# Patient Record
Sex: FEMALE | Race: BLACK OR AFRICAN AMERICAN | Employment: FULL TIME | URBAN - METROPOLITAN AREA
[De-identification: names, ages, dates, MRNs, and addresses within clinical notes are randomized per-mention and may not be internally consistent; named-entity substitution may affect disease eponyms.]

---

## 2017-10-26 ENCOUNTER — HOSPITAL ENCOUNTER (EMERGENCY)
Age: 27
Discharge: HOME | End: 2017-10-26
Attending: EMERGENCY MEDICINE | Admitting: EMERGENCY MEDICINE
Payer: MEDICAID

## 2017-10-26 VITALS
HEART RATE: 74 BPM | HEIGHT: 69 IN | RESPIRATION RATE: 16 BRPM | TEMPERATURE: 97.9 F | SYSTOLIC BLOOD PRESSURE: 111 MMHG | WEIGHT: 244 LBS | BODY MASS INDEX: 36.14 KG/M2 | OXYGEN SATURATION: 100 % | DIASTOLIC BLOOD PRESSURE: 69 MMHG

## 2017-10-26 DIAGNOSIS — B37.2 CANDIDAL DERMATITIS: Primary | ICD-10-CM

## 2017-10-26 PROCEDURE — 99282 EMERGENCY DEPT VISIT SF MDM: CPT

## 2017-10-26 RX ORDER — NYSTATIN 100000 [USP'U]/G
POWDER TOPICAL
Qty: 60 G | Refills: 0 | Status: SHIPPED | OUTPATIENT
Start: 2017-10-26

## 2017-10-26 RX ORDER — NYSTATIN 100000 U/G
CREAM TOPICAL
Qty: 30 G | Refills: 0 | Status: SHIPPED | OUTPATIENT
Start: 2017-10-26

## 2017-10-26 NOTE — ED PROVIDER NOTES
[address]  EMERGENCY DEPARTMENT HISTORY AND PHYSICAL EXAM       Date: 10/26/2017   Patient Name: Domo Dsouza   YOB: 1990  Medical Record Number: [id number]    History of Presenting Illness     No chief complaint on file. History Provided By:  patient    Additional History:  12:24 PM  Domo Dsouza is a 31 y.o. female who presents to the emergency department C/O pruriticand irritating lower abdomen along C section line onset 2 weeks ago. Associated symptoms include discoloration, pruritic feet, pruritic axillary, and pruritic thighs. Patient took 1% Hydrocortizone, grease, and Benadryl with no relief. Patient admits to scratching lower abdomen. Reports similar pruritic lower abdomen along C section line 4 months ago; rx Nystatin cream with relief. Pt denies abdominal pain any other Sx or complaints. Primary Care Provider: None   Specialist:    Past History     Past Medical History:   Past Medical History:   Diagnosis Date    Seasonal asthma         Past Surgical History:   Past Surgical History:   Procedure Laterality Date    HX CHOLECYSTECTOMY      HX GYN       x2    HX GYN      ectoptic preg    HX HEMORRHOIDECTOMY          Family History:   No family history on file. Social History:   Social History   Substance Use Topics    Smoking status: Not on file    Smokeless tobacco: Not on file    Alcohol use Not on file        Allergies: Allergies not on file     Review of Systems   Review of Systems   Constitutional: Negative for chills and fever. Gastrointestinal: Negative for abdominal pain. Skin: Positive for color change (discoloration) and rash. (+) pruritic axillary, thighs, lower abdomen along  line, and feet     All other systems reviewed and are negative.       Physical Exam  Vitals:    10/26/17 1216   BP: 111/69   Pulse: 74   Resp: 16   Temp: 97.9 °F (36.6 °C)   SpO2: 100%   Weight: 110.7 kg (244 lb) Height: 5' 9\" (1.753 m)       Physical Exam   Constitutional: She is oriented to person, place, and time. Vital signs are normal. She appears well-developed and well-nourished. No distress. HENT:   Head: Normocephalic and atraumatic. Eyes: Conjunctivae are normal.   Neck: Normal range of motion. Neck supple. Cardiovascular: Normal rate, regular rhythm and normal heart sounds. Pulmonary/Chest: Effort normal and breath sounds normal. No respiratory distress. Abdominal: Soft. Bowel sounds are normal. She exhibits no distension and no mass. There is no tenderness. There is no rebound and no guarding. Large abdominal panus with underlying candidal appearing rash noted. +Pruritis. Rash is white-esther and creamy (pt applied cream prior to ED) with mild erythema which appears to be 2/2 scratching/longstanding irritation. Skin is moist.    Genitourinary:         Musculoskeletal: Normal range of motion. Right shoulder: Normal.        Left shoulder: Normal.        Right ankle: Normal.        Left ankle: Normal.        Right upper arm: Normal.        Left upper arm: Normal.   +pruritic rash with dried skin and flaking noted between toes of bilateral feet (1 & 2nd toes predominant though all 5 webbings with +flaking skin)   Neurological: She is alert and oriented to person, place, and time. Skin: Skin is warm and dry. She is not diaphoretic. Psychiatric: She has a normal mood and affect. Her behavior is normal.   Nursing note and vitals reviewed. Diagnostic Study Results     Labs -    No results found for this or any previous visit (from the past 12 hour(s)). Radiologic Studies -  The following have been ordered and reviewed:  No orders to display           Medical Decision Making   I am the first provider for this patient. I reviewed the vital signs, available nursing notes, past medical history, past surgical history, family history and social history.      Vital Signs-Reviewed the patient's vital signs. No data found. Pulse Oximetry Analysis - Normal 100% on RA     Old Medical Records: Nursing notes. Procedures:   Procedures    ED Course:  12:24 PM  Initial assessment performed. The patients presenting problems have been discussed, and they are in agreement with the care plan formulated and outlined with them. I have encouraged them to ask questions as they arise throughout their visit. Medications Given in the ED:  Medications - No data to display    Discharge Note:  12:47 PM  Pt has been reexamined. Patient has no new complaints, changes, or physical findings. Care plan outlined and precautions discussed. Results were reviewed with the patient. All medications were reviewed with the patient; will d/c home with Mycostatin. All of pt's questions and concerns were addressed. Patient was instructed and agrees to follow up with PCP, as well as to return to the ED upon further deterioration. Patient is ready to go home. Diagnosis   Clinical Impression:   1. Candidal dermatitis           Follow-up Information     Follow up With Details Comments Contact Info    Kendal Stanley DO Schedule an appointment as soon as possible for a visit For primary care follow up [address] [facility]      [facility] EMERGENCY DEPT Go to As needed, as symptoms worsen [address] Kelly [facility]  [phone]          Discharge Medication List as of 10/26/2017 12:47 PM      START taking these medications    Details   nystatin (MYCOSTATIN) topical cream Apply a thin layer to affected area(s) of abdomen twice daily, continue 5 days past resolution of symptoms. , Print, Disp-30 g, R-0      nystatin (MYCOSTATIN) powder Apply to affected area(s) of feet, groin, underarms 4 times daily, continue 5 days past resolution of symptoms. , Print, Disp-60 g, R-0             _______________________________   Attestations:      This note is prepared by Kd Marino acting as a Scribe for [employer], PA-C. on 12:18 PM on 10/26/2017 . [employer], PA-C.: The scribe's documentation has been prepared under my direction and personally reviewed by me in its entirety.   _______________________________

## 2017-10-26 NOTE — DISCHARGE INSTRUCTIONS
Yeast Skin Infection: Care Instructions  Your Care Instructions    Yeast normally lives on your skin. Sometimes too much yeast can overgrow in certain areas of the skin and cause an infection. The infection causes red, scaly, moist patches on your skin that may itch. Common areas for skin yeast infections are skin folds under the breasts or belly area. The warm and moist areas in the skin folds can make it easier for yeast to overgrow. Yeast infections also can be found on other parts of the body such as the groin or armpits. You will probably get a cream or ointment that contains an antifungal medicine. Examples of these are miconazole and clotrimazole. You put it on your skin to treat the infection. Your doctor may give you a prescription for the cream or ointment. Or you may be able to buy it without a prescription at most drugstores. If the infection is severe, the doctor will prescribe antifungal pills. A yeast infection usually goes away after about a week of treatment. But it's important to use the medicine for as long as your doctor tells you to. Follow-up care is a key part of your treatment and safety. Be sure to make and go to all appointments, and call your doctor if you are having problems. It's also a good idea to know your test results and keep a list of the medicines you take. How can you care for yourself at home? · Be safe with medicines. Take your medicines exactly as prescribed. Call your doctor if you think you are having a problem with your medicine. · Keep your skin clean and dry. Your doctor may suggest using powder that contains an antifungal medicine in the skin folds. · Wear loose clothing. When should you call for help? Call your doctor now or seek immediate medical care if:  ? · You have symptoms of infection, such as:  ¨ Increased pain, swelling, warmth, or redness. ¨ Red streaks leading from the area. ¨ Pus draining from the area. ¨ A fever. ? Watch closely for changes in your health, and be sure to contact your doctor if:  ? · You do not get better as expected. Where can you learn more? Go to http://mana-anish.info/. Enter U111 in the search box to learn more about \"Yeast Skin Infection: Care Instructions. \"  Current as of: October 13, 2016  Content Version: 11.4  © 4728-9138 [employer]. Care instructions adapted under license by [employer] (which disclaims liability or warranty for this information). If you have questions about a medical condition or this instruction, always ask your healthcare professional. [address] any warranty or liability for your use of this information.

## 2017-10-26 NOTE — ED NOTES
Pt w/ reports of itching x 2 weeks in fold of Left armpit region - fold of lower abd region and both feet - + skin changes in these areas noted to appear chronic in nature.